# Patient Record
Sex: FEMALE | Race: WHITE | NOT HISPANIC OR LATINO
[De-identification: names, ages, dates, MRNs, and addresses within clinical notes are randomized per-mention and may not be internally consistent; named-entity substitution may affect disease eponyms.]

---

## 2017-02-09 ENCOUNTER — APPOINTMENT (OUTPATIENT)
Dept: FAMILY MEDICINE | Facility: CLINIC | Age: 52
End: 2017-02-09

## 2017-09-08 ENCOUNTER — APPOINTMENT (OUTPATIENT)
Dept: FAMILY MEDICINE | Facility: CLINIC | Age: 52
End: 2017-09-08
Payer: SELF-PAY

## 2017-09-08 VITALS
DIASTOLIC BLOOD PRESSURE: 80 MMHG | BODY MASS INDEX: 21.33 KG/M2 | SYSTOLIC BLOOD PRESSURE: 120 MMHG | RESPIRATION RATE: 16 BRPM | HEIGHT: 65 IN | HEART RATE: 76 BPM | OXYGEN SATURATION: 98 % | WEIGHT: 128 LBS

## 2017-09-08 DIAGNOSIS — N95.1 MENOPAUSAL AND FEMALE CLIMACTERIC STATES: ICD-10-CM

## 2017-09-08 DIAGNOSIS — L03.90 CELLULITIS, UNSPECIFIED: ICD-10-CM

## 2017-09-08 PROCEDURE — 99215 OFFICE O/P EST HI 40 MIN: CPT

## 2017-12-15 ENCOUNTER — APPOINTMENT (OUTPATIENT)
Dept: FAMILY MEDICINE | Facility: CLINIC | Age: 52
End: 2017-12-15

## 2020-01-17 ENCOUNTER — APPOINTMENT (OUTPATIENT)
Dept: FAMILY MEDICINE | Facility: CLINIC | Age: 55
End: 2020-01-17

## 2020-03-02 ENCOUNTER — APPOINTMENT (OUTPATIENT)
Dept: FAMILY MEDICINE | Facility: CLINIC | Age: 55
End: 2020-03-02
Payer: COMMERCIAL

## 2020-03-02 VITALS
WEIGHT: 126 LBS | BODY MASS INDEX: 20.99 KG/M2 | HEART RATE: 74 BPM | RESPIRATION RATE: 14 BRPM | SYSTOLIC BLOOD PRESSURE: 122 MMHG | TEMPERATURE: 98.1 F | HEIGHT: 65 IN | OXYGEN SATURATION: 96 % | DIASTOLIC BLOOD PRESSURE: 84 MMHG

## 2020-03-02 DIAGNOSIS — Z78.9 OTHER SPECIFIED HEALTH STATUS: ICD-10-CM

## 2020-03-02 PROCEDURE — 99204 OFFICE O/P NEW MOD 45 MIN: CPT | Mod: 25

## 2020-03-02 PROCEDURE — G0444 DEPRESSION SCREEN ANNUAL: CPT

## 2020-03-02 NOTE — HEALTH RISK ASSESSMENT
[No] : No [No falls in past year] : Patient reported no falls in the past year [0] : 2) Feeling down, depressed, or hopeless: Not at all (0) [de-identified] : none  [de-identified] : none  [] : No [[id number]] : 0 [de-identified] : healthy  [de-identified] : none

## 2020-03-02 NOTE — REVIEW OF SYSTEMS
[Fatigue] : fatigue [Joint Pain] : joint pain [Joint Swelling] : joint swelling [Negative] : Heme/Lymph

## 2020-03-05 ENCOUNTER — FORM ENCOUNTER (OUTPATIENT)
Age: 55
End: 2020-03-05

## 2020-03-06 ENCOUNTER — APPOINTMENT (OUTPATIENT)
Dept: MAMMOGRAPHY | Facility: CLINIC | Age: 55
End: 2020-03-06

## 2020-03-06 ENCOUNTER — APPOINTMENT (OUTPATIENT)
Dept: RADIOLOGY | Facility: CLINIC | Age: 55
End: 2020-03-06
Payer: COMMERCIAL

## 2020-03-06 PROCEDURE — 73130 X-RAY EXAM OF HAND: CPT | Mod: RT

## 2020-03-06 PROCEDURE — 77063 BREAST TOMOSYNTHESIS BI: CPT

## 2020-03-06 PROCEDURE — 77067 SCR MAMMO BI INCL CAD: CPT

## 2020-03-06 PROCEDURE — 73502 X-RAY EXAM HIP UNI 2-3 VIEWS: CPT | Mod: RT

## 2020-03-16 ENCOUNTER — APPOINTMENT (OUTPATIENT)
Dept: FAMILY MEDICINE | Facility: CLINIC | Age: 55
End: 2020-03-16

## 2020-03-18 ENCOUNTER — FORM ENCOUNTER (OUTPATIENT)
Age: 55
End: 2020-03-18

## 2020-03-19 ENCOUNTER — APPOINTMENT (OUTPATIENT)
Dept: ULTRASOUND IMAGING | Facility: CLINIC | Age: 55
End: 2020-03-19
Payer: COMMERCIAL

## 2020-03-19 PROCEDURE — 76642 ULTRASOUND BREAST LIMITED: CPT | Mod: 50

## 2020-04-22 ENCOUNTER — APPOINTMENT (OUTPATIENT)
Dept: GASTROENTEROLOGY | Facility: CLINIC | Age: 55
End: 2020-04-22

## 2020-07-10 ENCOUNTER — APPOINTMENT (OUTPATIENT)
Dept: BREAST CENTER | Facility: CLINIC | Age: 55
End: 2020-07-10

## 2020-09-18 ENCOUNTER — APPOINTMENT (OUTPATIENT)
Dept: FAMILY MEDICINE | Facility: CLINIC | Age: 55
End: 2020-09-18
Payer: COMMERCIAL

## 2020-09-18 ENCOUNTER — LABORATORY RESULT (OUTPATIENT)
Age: 55
End: 2020-09-18

## 2020-09-18 VITALS
OXYGEN SATURATION: 99 % | DIASTOLIC BLOOD PRESSURE: 83 MMHG | SYSTOLIC BLOOD PRESSURE: 129 MMHG | WEIGHT: 118 LBS | HEART RATE: 64 BPM | RESPIRATION RATE: 14 BRPM | HEIGHT: 65 IN | BODY MASS INDEX: 19.66 KG/M2

## 2020-09-18 DIAGNOSIS — R41.3 OTHER AMNESIA: ICD-10-CM

## 2020-09-18 DIAGNOSIS — E55.9 VITAMIN D DEFICIENCY, UNSPECIFIED: ICD-10-CM

## 2020-09-18 LAB — CYTOLOGY CVX/VAG DOC THIN PREP: NORMAL

## 2020-09-18 PROCEDURE — 99214 OFFICE O/P EST MOD 30 MIN: CPT | Mod: 25

## 2020-09-18 PROCEDURE — 36415 COLL VENOUS BLD VENIPUNCTURE: CPT

## 2020-09-18 NOTE — PHYSICAL EXAM
[Ill-Appearing] : ill-appearing [Normal] : normal rate, regular rhythm, normal S1 and S2 and no murmur heard [No Edema] : there was no peripheral edema [de-identified] : easily crying and depressed

## 2020-09-18 NOTE — HISTORY OF PRESENT ILLNESS
[FreeTextEntry1] : Follow up  [de-identified] : BREONNA RAY is a 54 year old female who presents with\par Right shoulder pain she has the pain for the past 2 weeks from working out\par She has been felling very depressed . She has been having episodes were she does not want to get out of her house or stay home alone. She  feels she is very sensitive to everything that is happening around her, she is crying often. She feels supported by her daughter and . He has been staying with her for the past week and she took of from work to relax. She feels suicidal thoughts, she denies a plan. she agreed that she will not do anything to hurt herself. She recognizes that she needs help. \par She did not have the blood work ordered during our first visit, will so it today. \par She is also c/o of pain on her right shoulder. \par

## 2020-09-25 LAB
25(OH)D3 SERPL-MCNC: 48.8 NG/ML
ANA SER IF-ACNC: NEGATIVE
APPEARANCE: CLEAR
BACTERIA: ABNORMAL
BASOPHILS # BLD AUTO: 0.05 K/UL
BASOPHILS NFR BLD AUTO: 1 %
BILIRUBIN URINE: NEGATIVE
BLOOD URINE: NEGATIVE
CHOLEST SERPL-MCNC: 231 MG/DL
CHOLEST/HDLC SERPL: 3.6 RATIO
COLOR: NORMAL
CRP SERPL-MCNC: 0.1 MG/DL
DSDNA AB SER-ACNC: <12 IU/ML
ENA SS-A AB SER IA-ACNC: <0.2 AL
ENA SS-B AB SER IA-ACNC: 1.2 AL
EOSINOPHIL # BLD AUTO: 0.24 K/UL
EOSINOPHIL NFR BLD AUTO: 5 %
ERYTHROCYTE [SEDIMENTATION RATE] IN BLOOD BY WESTERGREN METHOD: 4 MM/HR
ESTIMATED AVERAGE GLUCOSE: 117 MG/DL
FERRITIN SERPL-MCNC: 222 NG/ML
FOLATE SERPL-MCNC: 16.1 NG/ML
GLUCOSE QUALITATIVE U: NEGATIVE
HBA1C MFR BLD HPLC: 5.7 %
HCT VFR BLD CALC: 38.4 %
HDLC SERPL-MCNC: 64 MG/DL
HGB BLD-MCNC: 12.2 G/DL
HLA-B27 RELATED AG QL: NORMAL
HYALINE CASTS: 2 /LPF
IMM GRANULOCYTES NFR BLD AUTO: 0.2 %
IRON SERPL-MCNC: 102 UG/DL
KETONES URINE: NEGATIVE
LDLC SERPL CALC-MCNC: 157 MG/DL
LEUKOCYTE ESTERASE URINE: ABNORMAL
LYMPHOCYTES # BLD AUTO: 2.05 K/UL
LYMPHOCYTES NFR BLD AUTO: 42.8 %
MAGNESIUM SERPL-MCNC: 2 MG/DL
MAN DIFF?: NORMAL
MCHC RBC-ENTMCNC: 31.3 PG
MCHC RBC-ENTMCNC: 31.8 GM/DL
MCV RBC AUTO: 98.5 FL
MICROSCOPIC-UA: NORMAL
MONOCYTES # BLD AUTO: 0.4 K/UL
MONOCYTES NFR BLD AUTO: 8.4 %
NEUTROPHILS # BLD AUTO: 2.04 K/UL
NEUTROPHILS NFR BLD AUTO: 42.6 %
NITRITE URINE: NEGATIVE
PH URINE: 6.5
PLATELET # BLD AUTO: 183 K/UL
PROTEIN URINE: NEGATIVE
RBC # BLD: 3.9 M/UL
RBC # FLD: 12.2 %
RED BLOOD CELLS URINE: 2 /HPF
RHEUMATOID FACT SER QL: <10 IU/ML
SPECIFIC GRAVITY URINE: 1.01
SQUAMOUS EPITHELIAL CELLS: 11 /HPF
T3 SERPL-MCNC: 98 NG/DL
T3FREE SERPL-MCNC: 3.02 PG/ML
T3RU NFR SERPL: 1 TBI
T4 FREE SERPL-MCNC: 1.4 NG/DL
TRIGL SERPL-MCNC: 51 MG/DL
TSH SERPL-ACNC: 0.47 UIU/ML
URATE SERPL-MCNC: 3.1 MG/DL
UROBILINOGEN URINE: NORMAL
VIT B12 SERPL-MCNC: 903 PG/ML
WBC # FLD AUTO: 4.79 K/UL
WHITE BLOOD CELLS URINE: 11 /HPF

## 2020-10-05 ENCOUNTER — APPOINTMENT (OUTPATIENT)
Dept: FAMILY MEDICINE | Facility: CLINIC | Age: 55
End: 2020-10-05
Payer: COMMERCIAL

## 2020-10-05 ENCOUNTER — NON-APPOINTMENT (OUTPATIENT)
Age: 55
End: 2020-10-05

## 2020-10-05 VITALS
HEIGHT: 65 IN | DIASTOLIC BLOOD PRESSURE: 83 MMHG | TEMPERATURE: 98 F | OXYGEN SATURATION: 97 % | WEIGHT: 118 LBS | SYSTOLIC BLOOD PRESSURE: 118 MMHG | BODY MASS INDEX: 19.66 KG/M2 | RESPIRATION RATE: 16 BRPM | HEART RATE: 65 BPM

## 2020-10-05 DIAGNOSIS — N63.0 UNSPECIFIED LUMP IN UNSPECIFIED BREAST: ICD-10-CM

## 2020-10-05 PROCEDURE — 93000 ELECTROCARDIOGRAM COMPLETE: CPT

## 2020-10-05 PROCEDURE — 99396 PREV VISIT EST AGE 40-64: CPT | Mod: 25

## 2020-10-09 NOTE — HEALTH RISK ASSESSMENT
[Good] : ~his/her~  mood as  good [Patient declined PAP Smear] : Patient declined PAP Smear [Patient declined bone density test] : Patient declined bone density test [Patient declined colonoscopy] : Patient declined colonoscopy [HIV test declined] : HIV test declined [Hepatitis C test declined] : Hepatitis C test declined [Single] : single [Sexually Active] : sexually active [No] : In the past 12 months have you used drugs other than those required for medical reasons? No [No falls in past year] : Patient reported no falls in the past year [No Retinopathy] : No retinopathy [Patient reported mammogram was abnormal] : Patient reported mammogram was abnormal [Change in mental status noted] : Change in mental status noted [Language] : difficulty with language [None] : None [With Family] : lives with family [Employed] : employed [Feels Safe at Home] : Feels safe at home [Fully functional (bathing, dressing, toileting, transferring, walking, feeding)] : Fully functional (bathing, dressing, toileting, transferring, walking, feeding) [Fully functional (using the telephone, shopping, preparing meals, housekeeping, doing laundry, using] : Fully functional and needs no help or supervision to perform IADLs (using the telephone, shopping, preparing meals, housekeeping, doing laundry, using transportation, managing medications and managing finances) [Smoke Detector] : smoke detector [Carbon Monoxide Detector] : carbon monoxide detector [Seat Belt] :  uses seat belt [Sunscreen] : uses sunscreen [Reviewed updated] : Reviewed updated [] : No [de-identified] : none  [de-identified] : none  [de-identified] : none  [Reports changes in hearing] : Reports no changes in hearing [Reports changes in vision] : Reports no changes in vision [Reports changes in dental health] : Reports no changes in dental health [TB Exposure] : is not being exposed to tuberculosis [MammogramDate] : 2019 [ColonoscopyComments] : scheduled

## 2020-10-09 NOTE — PLAN
[FreeTextEntry1] : Spirometry not done due to covid \par EKG done, results reviewed and discussed with patient.\par Hearing test done, results reviewed and discussed with patient.\par We spent sufficient time to discuss aspects of care; questions were answered  to patient's satisfaction.The diagnosis and care plan were discussed with patient in detail.  Patient test results were  reviewed and explained in full. All questions were answered.

## 2020-10-09 NOTE — HISTORY OF PRESENT ILLNESS
[FreeTextEntry1] : Annual physical  [de-identified] : BREONNA RAY is a 54 year old female who presents for annual physical \par She was started on Prozac and Abilify. \par She feels better, we discussed all her results at length. she will follow up with rheumatologist and orthopedics

## 2020-10-29 ENCOUNTER — APPOINTMENT (OUTPATIENT)
Dept: RHEUMATOLOGY | Facility: CLINIC | Age: 55
End: 2020-10-29

## 2020-12-09 ENCOUNTER — APPOINTMENT (OUTPATIENT)
Dept: FAMILY MEDICINE | Facility: CLINIC | Age: 55
End: 2020-12-09
Payer: COMMERCIAL

## 2020-12-09 VITALS
HEART RATE: 69 BPM | BODY MASS INDEX: 19.66 KG/M2 | WEIGHT: 118 LBS | HEIGHT: 65 IN | DIASTOLIC BLOOD PRESSURE: 73 MMHG | TEMPERATURE: 96.9 F | OXYGEN SATURATION: 96 % | SYSTOLIC BLOOD PRESSURE: 114 MMHG

## 2020-12-09 DIAGNOSIS — R92.8 OTHER ABNORMAL AND INCONCLUSIVE FINDINGS ON DIAGNOSTIC IMAGING OF BREAST: ICD-10-CM

## 2020-12-09 LAB
BILIRUB UR QL STRIP: NORMAL
CLARITY UR: NORMAL
GLUCOSE UR-MCNC: NORMAL
HCG UR QL: NORMAL EU/DL
HGB UR QL STRIP.AUTO: NORMAL
KETONES UR-MCNC: NORMAL
LEUKOCYTE ESTERASE UR QL STRIP: NORMAL
NITRITE UR QL STRIP: NORMAL
PH UR STRIP: 6.5
PROT UR STRIP-MCNC: NORMAL
SP GR UR STRIP: 1.03

## 2020-12-09 PROCEDURE — 99072 ADDL SUPL MATRL&STAF TM PHE: CPT

## 2020-12-09 PROCEDURE — 99213 OFFICE O/P EST LOW 20 MIN: CPT | Mod: 25

## 2020-12-09 PROCEDURE — 81003 URINALYSIS AUTO W/O SCOPE: CPT | Mod: QW

## 2020-12-10 PROBLEM — R92.8 ABNORMAL MAMMOGRAPHY: Status: ACTIVE | Noted: 2020-12-09

## 2020-12-10 NOTE — HISTORY OF PRESENT ILLNESS
[FreeTextEntry8] : BREONNA RAY is a 55 year old female who presents with complaints of pain and burning when urinating. She reports the pains has been there for at least 3 days and is getting worse.  The pain is not radiating. She started drinking cranberry juice, no OTC. \par Denies hematuria, denies fevers \par In addition we discussed repeating her MAmmogram, she has not made an patricia to see the breast specialist.

## 2020-12-10 NOTE — HEALTH RISK ASSESSMENT
[No] : In the past 12 months have you used drugs other than those required for medical reasons? No [No falls in past year] : Patient reported no falls in the past year [] : No [de-identified] : none  [de-identified] : none  [de-identified] : none  [de-identified] : none

## 2020-12-10 NOTE — PHYSICAL EXAM
[Ill-Appearing] : ill-appearing [No Edema] : there was no peripheral edema [No CVA Tenderness] : no CVA  tenderness [Normal] : affect was normal and insight and judgment were intact

## 2020-12-10 NOTE — REVIEW OF SYSTEMS
[Fatigue] : fatigue [Dysuria] : dysuria [Nocturia] : nocturia [Vaginal Discharge] : no vaginal discharge [Dysmenorrhea] : dysmenorrhea [Negative] : Heme/Lymph

## 2020-12-14 LAB — BACTERIA UR CULT: ABNORMAL

## 2021-01-05 ENCOUNTER — APPOINTMENT (OUTPATIENT)
Dept: FAMILY MEDICINE | Facility: CLINIC | Age: 56
End: 2021-01-05

## 2021-01-19 ENCOUNTER — APPOINTMENT (OUTPATIENT)
Dept: ULTRASOUND IMAGING | Facility: CLINIC | Age: 56
End: 2021-01-19
Payer: COMMERCIAL

## 2021-01-19 ENCOUNTER — RESULT REVIEW (OUTPATIENT)
Age: 56
End: 2021-01-19

## 2021-01-19 PROCEDURE — 76642 ULTRASOUND BREAST LIMITED: CPT | Mod: 50

## 2021-01-22 ENCOUNTER — APPOINTMENT (OUTPATIENT)
Dept: FAMILY MEDICINE | Facility: CLINIC | Age: 56
End: 2021-01-22
Payer: COMMERCIAL

## 2021-01-22 ENCOUNTER — LABORATORY RESULT (OUTPATIENT)
Age: 56
End: 2021-01-22

## 2021-01-22 VITALS
TEMPERATURE: 98.1 F | DIASTOLIC BLOOD PRESSURE: 72 MMHG | WEIGHT: 118 LBS | BODY MASS INDEX: 19.66 KG/M2 | SYSTOLIC BLOOD PRESSURE: 104 MMHG | HEART RATE: 73 BPM | HEIGHT: 65 IN | OXYGEN SATURATION: 99 %

## 2021-01-22 VITALS — TEMPERATURE: 97.3 F

## 2021-01-22 DIAGNOSIS — R63.4 ABNORMAL WEIGHT LOSS: ICD-10-CM

## 2021-01-22 PROCEDURE — 99072 ADDL SUPL MATRL&STAF TM PHE: CPT

## 2021-01-22 PROCEDURE — 99214 OFFICE O/P EST MOD 30 MIN: CPT | Mod: 25

## 2021-01-22 NOTE — PHYSICAL EXAM
[Ill-Appearing] : ill-appearing [Cachectic] : cachexia was observed [No Edema] : there was no peripheral edema [Normal] : affect was normal and insight and judgment were intact [de-identified] : right shoulder painful ROM

## 2021-01-22 NOTE — REVIEW OF SYSTEMS
[Fatigue] : fatigue [Joint Pain] : joint pain [Joint Swelling] : joint swelling [Muscle Pain] : muscle pain [Back Pain] : back pain [Negative] : Heme/Lymph

## 2021-01-22 NOTE — HISTORY OF PRESENT ILLNESS
[FreeTextEntry1] : weight loss  [de-identified] : BREONNA RAY is a 55 year old female who presents for follow up, she continues to try to gain weight. She has been increasing her caloric intake and is feeling hungry. She has not gain any weight since her last visit. She is back to work as a  in a gym and feels better. She is not feeling depressed and wants to feel more energetic. She is very concerned about her weight loss. We discussed a dietitian consult and she is agreeable. \par In addition she completed Physical therapy for her right shoulder pain, she will follow up with orthopedic for possible MRI and injections. She continues to have pain. \par We discussed the breast US, she will have a mammogram follow up. \par

## 2021-01-26 ENCOUNTER — APPOINTMENT (OUTPATIENT)
Dept: SURGERY | Facility: CLINIC | Age: 56
End: 2021-01-26

## 2021-01-26 LAB
APPEARANCE: CLEAR
BACTERIA: ABNORMAL
BILIRUBIN URINE: NEGATIVE
BLOOD URINE: NEGATIVE
CHOLEST SERPL-MCNC: 227 MG/DL
COLOR: NORMAL
FOLATE SERPL-MCNC: 9.2 NG/ML
GLUCOSE QUALITATIVE U: NEGATIVE
HDLC SERPL-MCNC: 79 MG/DL
HYALINE CASTS: 3 /LPF
IRON SERPL-MCNC: 46 UG/DL
KETONES URINE: NEGATIVE
LDLC SERPL CALC-MCNC: 133 MG/DL
LEUKOCYTE ESTERASE URINE: ABNORMAL
MAGNESIUM SERPL-MCNC: 2.2 MG/DL
MICROSCOPIC-UA: NORMAL
NITRITE URINE: NEGATIVE
NONHDLC SERPL-MCNC: 148 MG/DL
PH URINE: 6
PROTEIN URINE: NEGATIVE
RED BLOOD CELLS URINE: 1 /HPF
SPECIFIC GRAVITY URINE: 1.02
SQUAMOUS EPITHELIAL CELLS: 8 /HPF
T3 SERPL-MCNC: 86 NG/DL
T3FREE SERPL-MCNC: 2.31 PG/ML
T3RU NFR SERPL: 1.1 TBI
T4 FREE SERPL-MCNC: 1 NG/DL
TRIGL SERPL-MCNC: 76 MG/DL
TSH SERPL-ACNC: 0.54 UIU/ML
UROBILINOGEN URINE: NORMAL
VIT B12 SERPL-MCNC: 524 PG/ML
WHITE BLOOD CELLS URINE: 16 /HPF

## 2021-03-08 ENCOUNTER — APPOINTMENT (OUTPATIENT)
Dept: OPHTHALMOLOGY | Facility: CLINIC | Age: 56
End: 2021-03-08

## 2021-03-15 ENCOUNTER — APPOINTMENT (OUTPATIENT)
Dept: RHEUMATOLOGY | Facility: CLINIC | Age: 56
End: 2021-03-15
Payer: COMMERCIAL

## 2021-03-15 DIAGNOSIS — R76.8 OTHER SPECIFIED ABNORMAL IMMUNOLOGICAL FINDINGS IN SERUM: ICD-10-CM

## 2021-03-15 DIAGNOSIS — F12.90 CANNABIS USE, UNSPECIFIED, UNCOMPLICATED: ICD-10-CM

## 2021-03-15 PROCEDURE — 99204 OFFICE O/P NEW MOD 45 MIN: CPT | Mod: 25

## 2021-03-15 PROCEDURE — 99072 ADDL SUPL MATRL&STAF TM PHE: CPT

## 2021-03-16 NOTE — HISTORY OF PRESENT ILLNESS
[FreeTextEntry1] : 55y F here for abnormal labs, referred from Gunjan Buckley NP\par \par - history of no medical coverage x 20 years and had PCP eval for first time in 4-5 years. Labs noted +SSA low titer w/ negative RF and JAYDEN. ESR/CRP normal as well. TFT normal, other labs within normal limits other than slight A1c in pre-DM range and elevated lipids.\par - notes diffuse body pains, has OA in hands, knees, hips. Reports early and severe OA in her mother and OA in her father\par - rates pain 10/10 w/ <30 min AM stiffness.\par - SH: works as , up early hours; remains active w/ working out.  Consumes large amounts of caffeine, occasional recreational marijuana use- uses for pain as well. Denies tobacco use or other iliicit drugs\par - ROS: denies constitutional sxs of fever/chills, weight loss, alopecia, oral/nasal ulcers, sicca sxs, CP/SOB, hematuria/frothy urine, myalgias, arthralgias/arthritis, Raynaud's, rash, nodules, or photosensitivity.  [de-identified] : \par \par All other ROS negative except as mentioned in HPI.

## 2021-03-16 NOTE — ASSESSMENT
[FreeTextEntry1] : 55y F with +SSA in low titer w/o evidence of polyarthritis or parotiditis and possibly very mild sicca, as she uses eye drops nightly and reports dry mouth and dehydration, however this may be 2/2 high caffeine consumption as well. Regardless, I would not diagnose her w/ systemic Sjögren's syndrome due to minimal symptoms and low-titer SSA without +RF.  I would obtain full AVISE panel and markers today. She has degenerative osteoarthritis w/ a strong family history of early and severe disease, which she has since her 40s. She had previous L IACS of her CMC with poor results.\par \par - labs\par - c/w supportive measures- oral hygiene, frequent water sips and consumption, eye drops prn\par \par RTO 1 year.

## 2021-03-16 NOTE — PHYSICAL EXAM
[Sclera] : the sclera and conjunctiva were normal [PERRL With Normal Accommodation] : pupils were equal in size, round, and reactive to light [Extraocular Movements] : extraocular movements were intact [Outer Ear] : the ears and nose were normal in appearance [Hearing Threshold Finger Rub Not [place]] : hearing was normal [Examination Of The Oral Cavity] : the lips and gums were normal [Nasal Cavity] : the nasal mucosa and septum were normal [Oropharynx] : the oropharynx was normal [Neck Appearance] : the appearance of the neck was normal [Neck Cervical Mass (___cm)] : no neck mass was observed [Jugular Venous Distention Increased] : there was no jugular-venous distention [Thyroid Diffuse Enlargement] : the thyroid was not enlarged [Thyroid Nodule] : there were no palpable thyroid nodules [Respiration, Rhythm And Depth] : normal respiratory rhythm and effort [Auscultation Breath Sounds / Voice Sounds] : lungs were clear to auscultation bilaterally [Heart Rate And Rhythm] : heart rate was normal and rhythm regular [Heart Sounds] : normal S1 and S2 [Heart Sounds Gallop] : no gallops [Murmurs] : no murmurs [Heart Sounds Pericardial Friction Rub] : no pericardial rub [Full Pulse] : the pedal pulses are present [Edema] : there was no peripheral edema [Bowel Sounds] : normal bowel sounds [Abdomen Soft] : soft [Abdomen Tenderness] : non-tender [Abdomen Mass (___ Cm)] : no abdominal mass palpated [Cervical Lymph Nodes Enlarged Posterior Bilaterally] : posterior cervical [Cervical Lymph Nodes Enlarged Anterior Bilaterally] : anterior cervical [No CVA Tenderness] : no ~M costovertebral angle tenderness [No Spinal Tenderness] : no spinal tenderness [Abnormal Walk] : normal gait [Nail Clubbing] : no clubbing  or cyanosis of the fingernails [Musculoskeletal - Swelling] : no joint swelling seen [Motor Tone] : muscle strength and tone were normal [Skin Color & Pigmentation] : normal skin color and pigmentation [Skin Turgor] : normal skin turgor [Skin Lesions] : no skin lesions [Deep Tendon Reflexes (DTR)] : deep tendon reflexes were 2+ and symmetric [Sensation] : the sensory exam was normal to light touch and pinprick [Motor Exam] : the motor exam was normal [No Focal Deficits] : no focal deficits [Affect] : the affect was normal [Mood] : the mood was normal [General Appearance - Alert] : alert [General Appearance - In No Acute Distress] : in no acute distress [General Appearance - Well Nourished] : well nourished [General Appearance - Well Developed] : well developed [General Appearance - Well-Appearing] : healthy appearing [] : normal voice and communication [FreeTextEntry1] : \par Hands- moderate very early OA changes in B/L hands with Heberden and Kevon's nodes. \par Wrists- normal\par Elbows- normal\par Shoulders- normal\par Hips- Reduced external rotation bilaterally. \par Knees- Patellofemoral crepitus bilaterally without effusion. \par Ankles- normal\par Feet- normal\par MMT 10/10 proximally/distally bilaterally.

## 2021-04-29 ENCOUNTER — OUTPATIENT (OUTPATIENT)
Dept: OUTPATIENT SERVICES | Facility: HOSPITAL | Age: 56
LOS: 1 days | End: 2021-04-29
Payer: COMMERCIAL

## 2021-04-29 PROCEDURE — 93010 ELECTROCARDIOGRAM REPORT: CPT

## 2021-05-04 ENCOUNTER — APPOINTMENT (OUTPATIENT)
Dept: DISASTER EMERGENCY | Facility: CLINIC | Age: 56
End: 2021-05-04

## 2021-05-05 LAB — SARS-COV-2 N GENE NPH QL NAA+PROBE: NOT DETECTED

## 2021-05-06 ENCOUNTER — APPOINTMENT (OUTPATIENT)
Dept: FAMILY MEDICINE | Facility: CLINIC | Age: 56
End: 2021-05-06
Payer: COMMERCIAL

## 2021-05-06 VITALS
TEMPERATURE: 98.8 F | HEART RATE: 78 BPM | OXYGEN SATURATION: 98 % | BODY MASS INDEX: 19.66 KG/M2 | SYSTOLIC BLOOD PRESSURE: 131 MMHG | WEIGHT: 118 LBS | DIASTOLIC BLOOD PRESSURE: 75 MMHG | HEIGHT: 65 IN | RESPIRATION RATE: 16 BRPM

## 2021-05-06 DIAGNOSIS — M17.0 BILATERAL PRIMARY OSTEOARTHRITIS OF KNEE: ICD-10-CM

## 2021-05-06 DIAGNOSIS — M19.042 PRIMARY OSTEOARTHRITIS, RIGHT HAND: ICD-10-CM

## 2021-05-06 DIAGNOSIS — Z13.820 ENCOUNTER FOR SCREENING FOR OSTEOPOROSIS: ICD-10-CM

## 2021-05-06 DIAGNOSIS — M19.041 PRIMARY OSTEOARTHRITIS, RIGHT HAND: ICD-10-CM

## 2021-05-06 DIAGNOSIS — M19.011 PRIMARY OSTEOARTHRITIS, RIGHT SHOULDER: ICD-10-CM

## 2021-05-06 DIAGNOSIS — M16.0 BILATERAL PRIMARY OSTEOARTHRITIS OF HIP: ICD-10-CM

## 2021-05-06 DIAGNOSIS — M19.012 PRIMARY OSTEOARTHRITIS, RIGHT SHOULDER: ICD-10-CM

## 2021-05-06 PROCEDURE — 99072 ADDL SUPL MATRL&STAF TM PHE: CPT

## 2021-05-06 PROCEDURE — 99214 OFFICE O/P EST MOD 30 MIN: CPT

## 2021-05-06 RX ORDER — OXYCODONE AND ACETAMINOPHEN 5; 325 MG/1; MG/1
5-325 TABLET ORAL
Qty: 20 | Refills: 0 | Status: DISCONTINUED | COMMUNITY
Start: 2020-10-15 | End: 2021-05-06

## 2021-05-06 RX ORDER — PHENAZOPYRIDINE 100 MG/1
100 TABLET, FILM COATED ORAL 3 TIMES DAILY
Qty: 15 | Refills: 0 | Status: DISCONTINUED | COMMUNITY
Start: 2020-12-09 | End: 2021-05-06

## 2021-05-06 RX ORDER — ARIPIPRAZOLE 5 MG/1
5 TABLET ORAL
Qty: 30 | Refills: 0 | Status: DISCONTINUED | COMMUNITY
Start: 2020-09-18 | End: 2021-05-06

## 2021-05-06 RX ORDER — CIPROFLOXACIN HYDROCHLORIDE 250 MG/1
250 TABLET, FILM COATED ORAL
Qty: 6 | Refills: 0 | Status: DISCONTINUED | COMMUNITY
Start: 2020-12-09 | End: 2021-05-06

## 2021-05-06 RX ORDER — CLINDAMYCIN PHOSPHATE 20 MG/G
2 CREAM VAGINAL
Qty: 40 | Refills: 0 | Status: DISCONTINUED | COMMUNITY
Start: 2020-10-29 | End: 2021-05-06

## 2021-05-06 RX ORDER — FLUOXETINE HYDROCHLORIDE 10 MG/1
10 CAPSULE ORAL
Qty: 30 | Refills: 0 | Status: DISCONTINUED | COMMUNITY
Start: 2020-09-18 | End: 2021-05-06

## 2021-05-06 NOTE — HISTORY OF PRESENT ILLNESS
[No Pertinent Cardiac History] : no history of aortic stenosis, atrial fibrillation, coronary artery disease, recent myocardial infarction, or implantable device/pacemaker [No Pertinent Pulmonary History] : no history of asthma, COPD, sleep apnea, or smoking [No Adverse Anesthesia Reaction] : no adverse anesthesia reaction in self or family member [(Patient denies any chest pain, claudication, dyspnea on exertion, orthopnea, palpitations or syncope)] : Patient denies any chest pain, claudication, dyspnea on exertion, orthopnea, palpitations or syncope [Excellent (>10 METs)] : Excellent (>10 METs) [Chronic Anticoagulation] : no chronic anticoagulation [Chronic Kidney Disease] : no chronic kidney disease [Diabetes] : no diabetes [FreeTextEntry1] : R shoulder arthroscopy  [FreeTextEntry2] : 05/07/21 [FreeTextEntry3] : Dr Sheehan   [FreeTextEntry4] : BREONNA RAY 55 year yrs old  female presents office comes for Preop Clearance for R shoulder Arthroscopy .\par Takes medical marihuana for the herniated disc in the neck and back and severe OA \par Patient states that Biceps repair and Rotator cuff repair and labrum .\par Patient states she can walk 4 miles without shortness of breath and 5 Flights of stairs .\par Patient states she lost weight after foot surgery lost her muscle mass and was depressed and now her weight is stable denies\par any GI complaints .\par Denies any other complaints. No Fever, Chills, Nausea, Vomiting, Diarrhea, Headache, Chest Pain, Shortness of breath or Abdominal pain.\par

## 2021-05-06 NOTE — ASSESSMENT
[High Risk Surgery - Intraperitoneal, Intrathoracic or Supringuinal Vascular Procedures] : High Risk Surgery - Intraperitoneal, Intrathoracic or Supringuinal Vascular Procedures - No (0) [Ischemic Heart Disease] : Ischemic Heart Disease - No (0) [Congestive Heart Failure] : Congestive Heart Failure - No (0) [Prior Cerebrovascular Accident or TIA] : Prior Cerebrovascular Accident or TIA - No (0) [Creatinine >= 2mg/dL (1 Point)] : Creatinine >= 2mg/dL - No (0) [Insulin-dependent Diabetic (1 Point)] : Insulin-dependent Diabetic - No (0) [0] : 0 , RCRI Class: I, Risk of Post-Op Cardiac Complications: 3.9%, 95% CI for Risk Estimate: 2.8% - 5.4% [Patient Optimized for Surgery] : Patient optimized for surgery [FreeTextEntry4] : PREOP Right shoulder arthroscopy \par RCRI -0 \par Meds reviewed.\par Patient will take morning meds with water the morning of procedure.\par Patient to stop all vitamins, aspirin and NSAIDs 5 days prior to procedure.\par Patient will followup after surgery \par Patient Moderate  risk for  Low risk Procedure \par Pt is medically cleared for procedure and medically optimized \par Patient is a nonsmoker.\par \par Chronic Back pain /Neck pain and Osteoarthritis\par -follows Pain management \par -Gets medical marihuana  \par \par Weight Loss after surgery \par -Gaining weight slowly about 14 pounds for the past 3 months \par -Drinking Boost \par -Heathy appetite eating well \par -Patient states she was depressed and lost her all her muscle mass \par -FOBT negative \par -US breast normal \par -Advised patient to follow up with GI for Colonoscopy \par \par \par Osteoporosis screening \par -DEXA scan \par \par \par RTO after follow up after surgery

## 2021-05-07 ENCOUNTER — OUTPATIENT (OUTPATIENT)
Dept: OUTPATIENT SERVICES | Facility: HOSPITAL | Age: 56
LOS: 1 days | End: 2021-05-07

## 2021-10-26 ENCOUNTER — APPOINTMENT (OUTPATIENT)
Dept: MRI IMAGING | Facility: CLINIC | Age: 56
End: 2021-10-26
Payer: COMMERCIAL

## 2021-10-26 PROCEDURE — 73221 MRI JOINT UPR EXTREM W/O DYE: CPT | Mod: RT

## 2021-11-05 ENCOUNTER — APPOINTMENT (OUTPATIENT)
Dept: FAMILY MEDICINE | Facility: CLINIC | Age: 56
End: 2021-11-05

## 2021-11-12 ENCOUNTER — OUTPATIENT (OUTPATIENT)
Dept: OUTPATIENT SERVICES | Facility: HOSPITAL | Age: 56
LOS: 1 days | End: 2021-11-12
Payer: COMMERCIAL

## 2021-11-12 PROCEDURE — 93010 ELECTROCARDIOGRAM REPORT: CPT

## 2021-11-16 ENCOUNTER — APPOINTMENT (OUTPATIENT)
Dept: FAMILY MEDICINE | Facility: CLINIC | Age: 56
End: 2021-11-16
Payer: COMMERCIAL

## 2021-11-16 ENCOUNTER — LABORATORY RESULT (OUTPATIENT)
Age: 56
End: 2021-11-16

## 2021-11-16 VITALS
SYSTOLIC BLOOD PRESSURE: 106 MMHG | TEMPERATURE: 98.2 F | BODY MASS INDEX: 18.66 KG/M2 | DIASTOLIC BLOOD PRESSURE: 73 MMHG | WEIGHT: 112 LBS | OXYGEN SATURATION: 99 % | HEART RATE: 73 BPM | HEIGHT: 65 IN

## 2021-11-16 PROCEDURE — 99214 OFFICE O/P EST MOD 30 MIN: CPT

## 2021-11-17 LAB
CHOLEST SERPL-MCNC: 239 MG/DL
HDLC SERPL-MCNC: 73 MG/DL
LDLC SERPL CALC-MCNC: 156 MG/DL
NONHDLC SERPL-MCNC: 165 MG/DL
TRIGL SERPL-MCNC: 49 MG/DL

## 2021-11-18 LAB
BASOPHILS # BLD AUTO: 0.07 K/UL
BASOPHILS NFR BLD AUTO: 1 %
EOSINOPHIL # BLD AUTO: 0.21 K/UL
EOSINOPHIL NFR BLD AUTO: 3 %
HCT VFR BLD CALC: 34.8 %
HGB BLD-MCNC: 11.2 G/DL
IMM GRANULOCYTES NFR BLD AUTO: 0.1 %
LYMPHOCYTES # BLD AUTO: 2.34 K/UL
LYMPHOCYTES NFR BLD AUTO: 33.9 %
MAN DIFF?: NORMAL
MCHC RBC-ENTMCNC: 31.3 PG
MCHC RBC-ENTMCNC: 32.2 GM/DL
MCV RBC AUTO: 97.2 FL
MONOCYTES # BLD AUTO: 0.48 K/UL
MONOCYTES NFR BLD AUTO: 7 %
NEUTROPHILS # BLD AUTO: 3.79 K/UL
NEUTROPHILS NFR BLD AUTO: 55 %
PLATELET # BLD AUTO: NORMAL K/UL
RBC # BLD: 3.58 M/UL
RBC # FLD: 12.9 %
WBC # FLD AUTO: 6.9 K/UL

## 2021-11-23 ENCOUNTER — APPOINTMENT (OUTPATIENT)
Dept: DISASTER EMERGENCY | Facility: CLINIC | Age: 56
End: 2021-11-23

## 2021-11-24 LAB — SARS-COV-2 N GENE NPH QL NAA+PROBE: NOT DETECTED

## 2021-11-26 ENCOUNTER — OUTPATIENT (OUTPATIENT)
Dept: OUTPATIENT SERVICES | Facility: HOSPITAL | Age: 56
LOS: 1 days | End: 2021-11-26

## 2022-02-28 DIAGNOSIS — M19.90 UNSPECIFIED OSTEOARTHRITIS, UNSPECIFIED SITE: ICD-10-CM

## 2022-03-24 LAB
A-TUMOR NECROSIS FACT SERPL-MCNC: 2.1 PG/ML
CA VI IGA AB: NORMAL
CA VI IGG AB: 2.1 EU/ML
CA VI IGM AB: 32.5 EU/ML
CK SERPL-CCNC: 96 U/L
ERYTHROCYTE [SEDIMENTATION RATE] IN BLOOD BY WESTERGREN METHOD: 4 MM/HR
IGNF SERPL-MCNC: <4.2 PG/ML
IL10 SERPL-MCNC: <2.8 PG/ML
IL12 SERPL-MCNC: <1.9 PG/ML
IL13 SERPL-MCNC: <1.7 PG/ML
IL17A SERPL-MCNC: <1.4 PG/ML
IL2 SERPL-MCNC: 101.1 PG/ML
IL2 SERPL-MCNC: <2.1 PG/ML
IL4 SERPL-MCNC: <2.2 PG/ML
IL6 SERPL-MCNC: <2 PG/ML
IL8 SERPL-MCNC: <3 PG/ML
INTERLEUKIN 1 BETA: <6.5 PG/ML
INTERLEUKIN 5: <2.1 PG/ML
MISCELLANEOUS TEST: NORMAL
PROC NAME: NORMAL
PSP IGA AB: 6.3 EU/ML
PSP IGG AB: 3.3 EU/ML
PSP IGM AB: NORMAL
SEROLOGY COMMENTS: NORMAL
SP-1 IGA AB: 3.5 EU/ML
SP-1 IGG AB: 2 EU/ML
SP-1 IGM AB: 4.9 EU/ML

## 2022-06-02 ENCOUNTER — APPOINTMENT (OUTPATIENT)
Dept: FAMILY MEDICINE | Facility: CLINIC | Age: 57
End: 2022-06-02
Payer: COMMERCIAL

## 2022-06-02 VITALS
WEIGHT: 113 LBS | HEART RATE: 91 BPM | TEMPERATURE: 98 F | DIASTOLIC BLOOD PRESSURE: 85 MMHG | SYSTOLIC BLOOD PRESSURE: 132 MMHG | HEIGHT: 65 IN | RESPIRATION RATE: 14 BRPM | BODY MASS INDEX: 18.83 KG/M2 | OXYGEN SATURATION: 98 %

## 2022-06-02 DIAGNOSIS — R42 DIZZINESS AND GIDDINESS: ICD-10-CM

## 2022-06-02 DIAGNOSIS — R51.9 HEADACHE, UNSPECIFIED: ICD-10-CM

## 2022-06-02 PROCEDURE — G0444 DEPRESSION SCREEN ANNUAL: CPT | Mod: 59

## 2022-06-02 PROCEDURE — 99214 OFFICE O/P EST MOD 30 MIN: CPT | Mod: 25

## 2022-06-02 RX ORDER — MECLIZINE HYDROCHLORIDE 25 MG/1
25 TABLET ORAL 4 TIMES DAILY
Qty: 120 | Refills: 0 | Status: ACTIVE | COMMUNITY
Start: 2022-06-02 | End: 1900-01-01

## 2022-06-13 ENCOUNTER — NON-APPOINTMENT (OUTPATIENT)
Age: 57
End: 2022-06-13

## 2022-06-16 ENCOUNTER — TRANSCRIPTION ENCOUNTER (OUTPATIENT)
Age: 57
End: 2022-06-16

## 2022-06-16 ENCOUNTER — NON-APPOINTMENT (OUTPATIENT)
Age: 57
End: 2022-06-16

## 2022-06-20 ENCOUNTER — APPOINTMENT (OUTPATIENT)
Dept: FAMILY MEDICINE | Facility: CLINIC | Age: 57
End: 2022-06-20

## 2022-07-29 ENCOUNTER — NON-APPOINTMENT (OUTPATIENT)
Age: 57
End: 2022-07-29

## 2022-07-29 ENCOUNTER — RESULT REVIEW (OUTPATIENT)
Age: 57
End: 2022-07-29

## 2022-07-29 ENCOUNTER — APPOINTMENT (OUTPATIENT)
Dept: FAMILY MEDICINE | Facility: CLINIC | Age: 57
End: 2022-07-29

## 2022-07-29 VITALS
RESPIRATION RATE: 18 BRPM | HEART RATE: 92 BPM | HEIGHT: 65 IN | TEMPERATURE: 98.9 F | SYSTOLIC BLOOD PRESSURE: 172 MMHG | DIASTOLIC BLOOD PRESSURE: 109 MMHG | OXYGEN SATURATION: 97 % | BODY MASS INDEX: 18.33 KG/M2 | WEIGHT: 110 LBS

## 2022-07-29 DIAGNOSIS — F32.A DEPRESSION, UNSPECIFIED: ICD-10-CM

## 2022-07-29 DIAGNOSIS — R23.2 FLUSHING: ICD-10-CM

## 2022-07-29 DIAGNOSIS — Z00.00 ENCOUNTER FOR GENERAL ADULT MEDICAL EXAMINATION W/OUT ABNORMAL FINDINGS: ICD-10-CM

## 2022-07-29 DIAGNOSIS — Z12.39 ENCOUNTER FOR OTHER SCREENING FOR MALIGNANT NEOPLASM OF BREAST: ICD-10-CM

## 2022-07-29 PROCEDURE — 93000 ELECTROCARDIOGRAM COMPLETE: CPT | Mod: 59

## 2022-07-29 PROCEDURE — 99396 PREV VISIT EST AGE 40-64: CPT | Mod: 25

## 2022-07-29 PROCEDURE — 92552 PURE TONE AUDIOMETRY AIR: CPT

## 2022-07-29 PROCEDURE — G0444 DEPRESSION SCREEN ANNUAL: CPT | Mod: 59

## 2022-11-01 ENCOUNTER — APPOINTMENT (OUTPATIENT)
Dept: ULTRASOUND IMAGING | Facility: CLINIC | Age: 57
End: 2022-11-01

## 2022-11-01 ENCOUNTER — RESULT REVIEW (OUTPATIENT)
Age: 57
End: 2022-11-01

## 2022-11-01 ENCOUNTER — APPOINTMENT (OUTPATIENT)
Dept: MAMMOGRAPHY | Facility: CLINIC | Age: 57
End: 2022-11-01

## 2022-11-01 ENCOUNTER — APPOINTMENT (OUTPATIENT)
Dept: MRI IMAGING | Facility: CLINIC | Age: 57
End: 2022-11-01

## 2022-11-01 PROCEDURE — 73220 MRI UPPR EXTREMITY W/O&W/DYE: CPT | Mod: RT

## 2022-11-01 PROCEDURE — 77062 BREAST TOMOSYNTHESIS BI: CPT

## 2022-11-01 PROCEDURE — A9585: CPT

## 2022-11-01 PROCEDURE — 77066 DX MAMMO INCL CAD BI: CPT

## 2022-11-01 PROCEDURE — 76641 ULTRASOUND BREAST COMPLETE: CPT | Mod: 50

## 2022-11-15 ENCOUNTER — APPOINTMENT (OUTPATIENT)
Dept: MRI IMAGING | Facility: CLINIC | Age: 57
End: 2022-11-15

## 2022-11-15 PROCEDURE — 72141 MRI NECK SPINE W/O DYE: CPT

## 2022-11-21 ENCOUNTER — APPOINTMENT (OUTPATIENT)
Dept: OPHTHALMOLOGY | Facility: CLINIC | Age: 57
End: 2022-11-21

## 2022-12-27 ENCOUNTER — APPOINTMENT (OUTPATIENT)
Dept: FAMILY MEDICINE | Facility: CLINIC | Age: 57
End: 2022-12-27
Payer: COMMERCIAL

## 2022-12-27 ENCOUNTER — NON-APPOINTMENT (OUTPATIENT)
Age: 57
End: 2022-12-27

## 2022-12-27 VITALS
WEIGHT: 110 LBS | DIASTOLIC BLOOD PRESSURE: 84 MMHG | BODY MASS INDEX: 18.33 KG/M2 | SYSTOLIC BLOOD PRESSURE: 144 MMHG | TEMPERATURE: 96.9 F | HEIGHT: 65 IN | HEART RATE: 74 BPM | OXYGEN SATURATION: 100 %

## 2022-12-27 DIAGNOSIS — M79.641 PAIN IN RIGHT HAND: ICD-10-CM

## 2022-12-27 DIAGNOSIS — Z01.818 ENCOUNTER FOR OTHER PREPROCEDURAL EXAMINATION: ICD-10-CM

## 2022-12-27 PROCEDURE — 99214 OFFICE O/P EST MOD 30 MIN: CPT

## 2023-03-13 ENCOUNTER — NON-APPOINTMENT (OUTPATIENT)
Age: 58
End: 2023-03-13

## 2023-03-16 ENCOUNTER — APPOINTMENT (OUTPATIENT)
Dept: FAMILY MEDICINE | Facility: CLINIC | Age: 58
End: 2023-03-16
Payer: COMMERCIAL

## 2023-03-16 ENCOUNTER — RESULT REVIEW (OUTPATIENT)
Age: 58
End: 2023-03-16

## 2023-03-16 VITALS
HEART RATE: 82 BPM | RESPIRATION RATE: 14 BRPM | DIASTOLIC BLOOD PRESSURE: 76 MMHG | SYSTOLIC BLOOD PRESSURE: 112 MMHG | WEIGHT: 110 LBS | OXYGEN SATURATION: 99 % | BODY MASS INDEX: 18.33 KG/M2 | HEIGHT: 65 IN | TEMPERATURE: 98 F

## 2023-03-16 DIAGNOSIS — M25.50 PAIN IN UNSPECIFIED JOINT: ICD-10-CM

## 2023-03-16 DIAGNOSIS — M54.2 CERVICALGIA: ICD-10-CM

## 2023-03-16 DIAGNOSIS — K21.9 GASTRO-ESOPHAGEAL REFLUX DISEASE W/OUT ESOPHAGITIS: ICD-10-CM

## 2023-03-16 DIAGNOSIS — R10.9 UNSPECIFIED ABDOMINAL PAIN: ICD-10-CM

## 2023-03-16 PROCEDURE — 99214 OFFICE O/P EST MOD 30 MIN: CPT

## 2023-03-20 ENCOUNTER — RESULT REVIEW (OUTPATIENT)
Age: 58
End: 2023-03-20

## 2023-03-21 ENCOUNTER — APPOINTMENT (OUTPATIENT)
Dept: CT IMAGING | Facility: CLINIC | Age: 58
End: 2023-03-21
Payer: COMMERCIAL

## 2023-03-21 PROCEDURE — 74178 CT ABD&PLV WO CNTR FLWD CNTR: CPT

## 2023-03-23 ENCOUNTER — APPOINTMENT (OUTPATIENT)
Dept: FAMILY MEDICINE | Facility: CLINIC | Age: 58
End: 2023-03-23

## 2023-03-24 ENCOUNTER — RESULT REVIEW (OUTPATIENT)
Age: 58
End: 2023-03-24

## 2023-03-24 ENCOUNTER — APPOINTMENT (OUTPATIENT)
Dept: RADIOLOGY | Facility: CLINIC | Age: 58
End: 2023-03-24
Payer: COMMERCIAL

## 2023-03-24 PROCEDURE — 72100 X-RAY EXAM L-S SPINE 2/3 VWS: CPT

## 2023-03-24 PROCEDURE — 73522 X-RAY EXAM HIPS BI 3-4 VIEWS: CPT

## 2023-03-30 ENCOUNTER — APPOINTMENT (OUTPATIENT)
Dept: FAMILY MEDICINE | Facility: CLINIC | Age: 58
End: 2023-03-30

## 2023-04-19 ENCOUNTER — RX RENEWAL (OUTPATIENT)
Age: 58
End: 2023-04-19

## 2023-05-16 ENCOUNTER — RX RENEWAL (OUTPATIENT)
Age: 58
End: 2023-05-16

## 2023-06-09 ENCOUNTER — APPOINTMENT (OUTPATIENT)
Dept: FAMILY MEDICINE | Facility: CLINIC | Age: 58
End: 2023-06-09

## 2023-06-20 ENCOUNTER — RX RENEWAL (OUTPATIENT)
Age: 58
End: 2023-06-20

## 2023-06-20 ENCOUNTER — APPOINTMENT (OUTPATIENT)
Dept: FAMILY MEDICINE | Facility: CLINIC | Age: 58
End: 2023-06-20

## 2023-06-27 ENCOUNTER — LABORATORY RESULT (OUTPATIENT)
Age: 58
End: 2023-06-27

## 2023-06-27 ENCOUNTER — APPOINTMENT (OUTPATIENT)
Dept: FAMILY MEDICINE | Facility: CLINIC | Age: 58
End: 2023-06-27
Payer: COMMERCIAL

## 2023-06-27 VITALS
HEIGHT: 65 IN | TEMPERATURE: 98.8 F | WEIGHT: 110 LBS | HEART RATE: 71 BPM | SYSTOLIC BLOOD PRESSURE: 113 MMHG | DIASTOLIC BLOOD PRESSURE: 71 MMHG | BODY MASS INDEX: 18.33 KG/M2 | OXYGEN SATURATION: 99 %

## 2023-06-27 DIAGNOSIS — D64.9 ANEMIA, UNSPECIFIED: ICD-10-CM

## 2023-06-27 DIAGNOSIS — K42.9 UMBILICAL HERNIA W/OUT OBSTRUCTION OR GANGRENE: ICD-10-CM

## 2023-06-27 DIAGNOSIS — F41.9 ANXIETY DISORDER, UNSPECIFIED: ICD-10-CM

## 2023-06-27 DIAGNOSIS — N39.0 URINARY TRACT INFECTION, SITE NOT SPECIFIED: ICD-10-CM

## 2023-06-27 DIAGNOSIS — A49.9 URINARY TRACT INFECTION, SITE NOT SPECIFIED: ICD-10-CM

## 2023-06-27 PROCEDURE — 99214 OFFICE O/P EST MOD 30 MIN: CPT | Mod: 25

## 2023-06-29 DIAGNOSIS — R79.89 OTHER SPECIFIED ABNORMAL FINDINGS OF BLOOD CHEMISTRY: ICD-10-CM

## 2023-06-29 LAB
25(OH)D3 SERPL-MCNC: 27.6 NG/ML
ALBUMIN SERPL ELPH-MCNC: 5.1 G/DL
ALP BLD-CCNC: 74 U/L
ALT SERPL-CCNC: 148 U/L
ANION GAP SERPL CALC-SCNC: 11 MMOL/L
APPEARANCE: CLEAR
AST SERPL-CCNC: 90 U/L
BILIRUB SERPL-MCNC: 0.4 MG/DL
BILIRUBIN URINE: NEGATIVE
BLOOD URINE: NEGATIVE
BUN SERPL-MCNC: 12 MG/DL
CALCIUM SERPL-MCNC: 10 MG/DL
CHLORIDE SERPL-SCNC: 103 MMOL/L
CHOLEST SERPL-MCNC: 257 MG/DL
CLAM IGE QN: <0.1 KUA/L
CO2 SERPL-SCNC: 28 MMOL/L
CODFISH IGE QN: <0.1 KUA/L
COLOR: YELLOW
CORN IGE QN: <0.1 KUA/L
COW MILK IGE QN: <0.1 KUA/L
CREAT SERPL-MCNC: 0.76 MG/DL
DEPRECATED CLAM IGE RAST QL: 0
DEPRECATED CODFISH IGE RAST QL: 0
DEPRECATED CORN IGE RAST QL: 0
DEPRECATED COW MILK IGE RAST QL: 0
DEPRECATED EGG WHITE IGE RAST QL: 0
DEPRECATED GLUTEN IGE RAST QL: <0.1 KUA/L
DEPRECATED PEANUT IGE RAST QL: 0
DEPRECATED SCALLOP IGE RAST QL: <0.1 KUA/L
DEPRECATED SESAME SEED IGE RAST QL: 0
DEPRECATED SHRIMP IGE RAST QL: 0
DEPRECATED SOYBEAN IGE RAST QL: 0
DEPRECATED WALNUT IGE RAST QL: 0
DEPRECATED WHEAT IGE RAST QL: 0
EGFR: 92 ML/MIN/1.73M2
EGG WHITE IGE QN: <0.1 KUA/L
ESTIMATED AVERAGE GLUCOSE: 120 MG/DL
FERRITIN SERPL-MCNC: 182 NG/ML
FOLATE SERPL-MCNC: 10.4 NG/ML
GLUCOSE QUALITATIVE U: NEGATIVE MG/DL
GLUCOSE SERPL-MCNC: 71 MG/DL
GLUTEN IGG QN: 0
HBA1C MFR BLD HPLC: 5.8 %
HDLC SERPL-MCNC: 68 MG/DL
KETONES URINE: NEGATIVE MG/DL
LDLC SERPL CALC-MCNC: 167 MG/DL
LEUKOCYTE ESTERASE URINE: ABNORMAL
NITRITE URINE: NEGATIVE
NONHDLC SERPL-MCNC: 189 MG/DL
PEANUT IGE QN: <0.1 KUA/L
PH URINE: 7
POTASSIUM SERPL-SCNC: 4.2 MMOL/L
PROT SERPL-MCNC: 7.2 G/DL
PROTEIN URINE: NEGATIVE MG/DL
SCALLOP IGE QN: 0
SCALLOP IGE QN: <0.1 KUA/L
SESAME SEED IGE QN: <0.1 KUA/L
SODIUM SERPL-SCNC: 143 MMOL/L
SOYBEAN IGE QN: <0.1 KUA/L
SPECIFIC GRAVITY URINE: 1.01
TOTAL IGE SMQN RAST: 6 KU/L
TRIGL SERPL-MCNC: 107 MG/DL
TSH SERPL-ACNC: 1.62 UIU/ML
UROBILINOGEN URINE: 0.2 MG/DL
VIT B12 SERPL-MCNC: 683 PG/ML
WALNUT IGE QN: <0.1 KUA/L
WHEAT IGE QN: <0.1 KUA/L

## 2023-06-30 LAB
HAV IGM SER QL: NONREACTIVE
HBV CORE IGM SER QL: NONREACTIVE
HBV SURFACE AG SER QL: NONREACTIVE
HCV AB SER QL: NONREACTIVE
HCV S/CO RATIO: 0.14 S/CO

## 2023-07-05 ENCOUNTER — NON-APPOINTMENT (OUTPATIENT)
Age: 58
End: 2023-07-05

## 2023-07-19 ENCOUNTER — APPOINTMENT (OUTPATIENT)
Dept: UROLOGY | Facility: CLINIC | Age: 58
End: 2023-07-19
Payer: COMMERCIAL

## 2023-07-19 VITALS
HEIGHT: 65 IN | DIASTOLIC BLOOD PRESSURE: 75 MMHG | SYSTOLIC BLOOD PRESSURE: 145 MMHG | OXYGEN SATURATION: 98 % | BODY MASS INDEX: 17.99 KG/M2 | WEIGHT: 108 LBS | HEART RATE: 79 BPM

## 2023-07-19 DIAGNOSIS — N39.41 URGE INCONTINENCE: ICD-10-CM

## 2023-07-19 PROCEDURE — 99213 OFFICE O/P EST LOW 20 MIN: CPT

## 2023-07-19 NOTE — LETTER BODY
[Dear  ___] : Dear  [unfilled], [Consult Letter:] : I had the pleasure of evaluating your patient, [unfilled]. [Please see my note below.] : Please see my note below. [Consult Closing:] : Thank you very much for allowing me to participate in the care of this patient.  If you have any questions, please do not hesitate to contact me. [Sincerely,] : Sincerely, [FreeTextEntry3] : Nikos Singh, DO\par Genitourinary Medicine\par

## 2023-07-19 NOTE — HISTORY OF PRESENT ILLNESS
[FreeTextEntry1] : Ms. BREONNA RAY 57 year old  F no  PMH and PSH right foot, right shoulder, right thumb, right carpal tunnel, c- section. Pt comes in bc of urgency with leakage. Pt also having urgencies with both BM and urination. Former smoker stopped 15 years smoked for about 20 years. \par

## 2023-07-19 NOTE — ASSESSMENT
[FreeTextEntry1] : Plan\par UA\par culture\par renal bladder US\par recommend Neurology \par fu 3 weeks

## 2023-07-19 NOTE — PHYSICAL EXAM
[General Appearance - Well Developed] : well developed [General Appearance - Well Nourished] : well nourished [Normal Appearance] : normal appearance [Well Groomed] : well groomed [General Appearance - In No Acute Distress] : no acute distress [Edema] : no peripheral edema [Respiration, Rhythm And Depth] : normal respiratory rhythm and effort [Exaggerated Use Of Accessory Muscles For Inspiration] : no accessory muscle use [Abdomen Soft] : soft [Abdomen Tenderness] : non-tender [Costovertebral Angle Tenderness] : no ~M costovertebral angle tenderness [Normal Station and Gait] : the gait and station were normal for the patient's age [] : no rash [No Focal Deficits] : no focal deficits [Oriented To Time, Place, And Person] : oriented to person, place, and time [Affect] : the affect was normal [Not Anxious] : not anxious [Mood] : the mood was normal

## 2023-07-21 LAB
APPEARANCE: CLEAR
BACTERIA UR CULT: NORMAL
BACTERIA: NEGATIVE /HPF
BILIRUBIN URINE: NEGATIVE
BLOOD URINE: NEGATIVE
CAST: 0 /LPF
COLOR: YELLOW
EPITHELIAL CELLS: 1 /HPF
GLUCOSE QUALITATIVE U: NEGATIVE MG/DL
KETONES URINE: NEGATIVE MG/DL
LEUKOCYTE ESTERASE URINE: ABNORMAL
MICROSCOPIC-UA: NORMAL
NITRITE URINE: NEGATIVE
PH URINE: 7
PROTEIN URINE: NEGATIVE MG/DL
RED BLOOD CELLS URINE: 1 /HPF
REVIEW: NORMAL
SPECIFIC GRAVITY URINE: 1.01
UROBILINOGEN URINE: 0.2 MG/DL
WHITE BLOOD CELLS URINE: 0 /HPF

## 2023-07-31 ENCOUNTER — RX RENEWAL (OUTPATIENT)
Age: 58
End: 2023-07-31

## 2023-08-01 ENCOUNTER — APPOINTMENT (OUTPATIENT)
Dept: FAMILY MEDICINE | Facility: CLINIC | Age: 58
End: 2023-08-01
Payer: COMMERCIAL

## 2023-08-01 DIAGNOSIS — G47.00 INSOMNIA, UNSPECIFIED: ICD-10-CM

## 2023-08-01 DIAGNOSIS — M54.50 LOW BACK PAIN, UNSPECIFIED: ICD-10-CM

## 2023-08-01 DIAGNOSIS — R35.0 FREQUENCY OF MICTURITION: ICD-10-CM

## 2023-08-01 DIAGNOSIS — Z01.818 ENCOUNTER FOR OTHER PREPROCEDURAL EXAMINATION: ICD-10-CM

## 2023-08-01 DIAGNOSIS — R19.4 CHANGE IN BOWEL HABIT: ICD-10-CM

## 2023-08-01 PROCEDURE — 99214 OFFICE O/P EST MOD 30 MIN: CPT | Mod: 25

## 2023-08-01 PROCEDURE — 93000 ELECTROCARDIOGRAM COMPLETE: CPT | Mod: 59

## 2023-09-08 ENCOUNTER — RX RENEWAL (OUTPATIENT)
Age: 58
End: 2023-09-08

## 2023-09-13 ENCOUNTER — RX RENEWAL (OUTPATIENT)
Age: 58
End: 2023-09-13

## 2023-10-13 ENCOUNTER — RX RENEWAL (OUTPATIENT)
Age: 58
End: 2023-10-13

## 2023-11-11 ENCOUNTER — RX RENEWAL (OUTPATIENT)
Age: 58
End: 2023-11-11

## 2023-11-21 ENCOUNTER — RX RENEWAL (OUTPATIENT)
Age: 58
End: 2023-11-21

## 2023-11-21 RX ORDER — HYDROXYZINE HYDROCHLORIDE 25 MG/1
25 TABLET ORAL EVERY 6 HOURS
Qty: 120 | Refills: 2 | Status: ACTIVE | COMMUNITY
Start: 2022-07-29 | End: 1900-01-01

## 2024-04-05 ENCOUNTER — APPOINTMENT (OUTPATIENT)
Dept: FAMILY MEDICINE | Facility: CLINIC | Age: 59
End: 2024-04-05

## 2024-04-05 VITALS
SYSTOLIC BLOOD PRESSURE: 110 MMHG | DIASTOLIC BLOOD PRESSURE: 69 MMHG | TEMPERATURE: 98.1 F | OXYGEN SATURATION: 96 % | HEART RATE: 85 BPM

## 2024-04-05 RX ORDER — PANTOPRAZOLE 40 MG/1
40 TABLET, DELAYED RELEASE ORAL
Qty: 180 | Refills: 1 | Status: ACTIVE | COMMUNITY
Start: 2023-03-16 | End: 1900-01-01

## 2024-04-20 ENCOUNTER — RX RENEWAL (OUTPATIENT)
Age: 59
End: 2024-04-20

## 2024-05-01 RX ORDER — NITROFURANTOIN (MONOHYDRATE/MACROCRYSTALS) 25; 75 MG/1; MG/1
100 CAPSULE ORAL
Qty: 10 | Refills: 0 | Status: ACTIVE | COMMUNITY
Start: 2024-05-01 | End: 1900-01-01

## 2024-05-16 ENCOUNTER — RX RENEWAL (OUTPATIENT)
Age: 59
End: 2024-05-16

## 2024-05-21 ENCOUNTER — APPOINTMENT (OUTPATIENT)
Dept: FAMILY MEDICINE | Facility: CLINIC | Age: 59
End: 2024-05-21
Payer: COMMERCIAL

## 2024-05-21 ENCOUNTER — LABORATORY RESULT (OUTPATIENT)
Age: 59
End: 2024-05-21

## 2024-05-21 VITALS
DIASTOLIC BLOOD PRESSURE: 83 MMHG | HEART RATE: 83 BPM | OXYGEN SATURATION: 98 % | SYSTOLIC BLOOD PRESSURE: 132 MMHG | HEIGHT: 65 IN | WEIGHT: 108 LBS | BODY MASS INDEX: 17.99 KG/M2

## 2024-05-21 DIAGNOSIS — R55 SYNCOPE AND COLLAPSE: ICD-10-CM

## 2024-05-21 DIAGNOSIS — F41.9 ANXIETY DISORDER, UNSPECIFIED: ICD-10-CM

## 2024-05-21 DIAGNOSIS — F32.A ANXIETY DISORDER, UNSPECIFIED: ICD-10-CM

## 2024-05-21 DIAGNOSIS — M54.9 DORSALGIA, UNSPECIFIED: ICD-10-CM

## 2024-05-21 DIAGNOSIS — R92.30 DENSE BREASTS, UNSPECIFIED: ICD-10-CM

## 2024-05-21 DIAGNOSIS — G89.29 DORSALGIA, UNSPECIFIED: ICD-10-CM

## 2024-05-21 DIAGNOSIS — M25.551 PAIN IN RIGHT HIP: ICD-10-CM

## 2024-05-21 PROCEDURE — G2211 COMPLEX E/M VISIT ADD ON: CPT

## 2024-05-21 PROCEDURE — 99214 OFFICE O/P EST MOD 30 MIN: CPT

## 2024-05-22 LAB
ALBUMIN SERPL ELPH-MCNC: 4.8 G/DL
ALP BLD-CCNC: 88 U/L
ALT SERPL-CCNC: 15 U/L
ANION GAP SERPL CALC-SCNC: 10 MMOL/L
APPEARANCE: CLEAR
AST SERPL-CCNC: 23 U/L
BACTERIA: NEGATIVE /HPF
BILIRUB SERPL-MCNC: 0.4 MG/DL
BILIRUBIN URINE: NEGATIVE
BLOOD URINE: NEGATIVE
BUN SERPL-MCNC: 14 MG/DL
CALCIUM SERPL-MCNC: 10.2 MG/DL
CAST: 0 /LPF
CHLORIDE SERPL-SCNC: 104 MMOL/L
CHOLEST SERPL-MCNC: 258 MG/DL
CO2 SERPL-SCNC: 28 MMOL/L
COLOR: YELLOW
CREAT SERPL-MCNC: 0.68 MG/DL
EGFR: 102 ML/MIN/1.73M2
EPITHELIAL CELLS: 1 /HPF
ESTIMATED AVERAGE GLUCOSE: 117 MG/DL
FERRITIN SERPL-MCNC: 101 NG/ML
FOLATE SERPL-MCNC: 10.6 NG/ML
GLUCOSE QUALITATIVE U: NEGATIVE MG/DL
GLUCOSE SERPL-MCNC: 90 MG/DL
HBA1C MFR BLD HPLC: 5.7 %
HCT VFR BLD CALC: 39.3 %
HDLC SERPL-MCNC: 77 MG/DL
HGB BLD-MCNC: 12.3 G/DL
KETONES URINE: NEGATIVE MG/DL
LDLC SERPL CALC-MCNC: 159 MG/DL
LEUKOCYTE ESTERASE URINE: ABNORMAL
MCHC RBC-ENTMCNC: 29.8 PG
MCHC RBC-ENTMCNC: 31.3 GM/DL
MCV RBC AUTO: 95.2 FL
MICROSCOPIC-UA: NORMAL
NITRITE URINE: NEGATIVE
NONHDLC SERPL-MCNC: 181 MG/DL
PH URINE: 7.5
PLATELET # BLD AUTO: 216 K/UL
POTASSIUM SERPL-SCNC: 4.4 MMOL/L
PROT SERPL-MCNC: 7.1 G/DL
PROTEIN URINE: NEGATIVE MG/DL
RBC # BLD: 4.13 M/UL
RBC # FLD: 12.7 %
RED BLOOD CELLS URINE: 0 /HPF
REVIEW: NORMAL
SODIUM SERPL-SCNC: 143 MMOL/L
SPECIFIC GRAVITY URINE: 1.01
TRIGL SERPL-MCNC: 124 MG/DL
TSH SERPL-ACNC: 1.02 UIU/ML
UROBILINOGEN URINE: 0.2 MG/DL
VIT B12 SERPL-MCNC: 556 PG/ML
WBC # FLD AUTO: 6.91 K/UL
WHITE BLOOD CELLS URINE: 0 /HPF

## 2024-05-23 LAB — BACTERIA UR CULT: NORMAL

## 2024-05-24 LAB
ALMOND IGE QN: <0.1 KUA/L
BRAZIL NUT IGE QN: <0.1 KUA/L
CASHEW NUT IGE QN: <0.1 KUA/L
CODFISH IGE QN: <0.1 KUA/L
COW MILK IGE QN: <0.1 KUA/L
DEPRECATED ALMOND IGE RAST QL: 0 (ref 0–?)
DEPRECATED BRAZIL NUT IGE RAST QL: 0 (ref 0–?)
DEPRECATED CASHEW NUT IGE RAST QL: 0 (ref 0–?)
DEPRECATED CODFISH IGE RAST QL: 0 (ref 0–?)
DEPRECATED COW MILK IGE RAST QL: 0 (ref 0–?)
DEPRECATED EGG WHITE IGE RAST QL: 0 (ref 0–?)
DEPRECATED HAZELNUT IGE RAST QL: 0 (ref 0–?)
DEPRECATED PEANUT IGE RAST QL: 0 (ref 0–?)
DEPRECATED SALMON IGE RAST QL: 0 (ref 0–?)
DEPRECATED SCALLOP IGE RAST QL: <0.1 KUA/L
DEPRECATED SESAME SEED IGE RAST QL: 0 (ref 0–?)
DEPRECATED SHRIMP IGE RAST QL: 0 (ref 0–?)
DEPRECATED SOYBEAN IGE RAST QL: 0 (ref 0–?)
DEPRECATED TUNA IGE RAST QL: 0 (ref 0–?)
DEPRECATED WALNUT IGE RAST QL: 0 (ref 0–?)
DEPRECATED WHEAT IGE RAST QL: 0 (ref 0–?)
EGG WHITE IGE QN: <0.1 KUA/L
HAZELNUT IGE QN: <0.1 KUA/L
PEANUT IGE QN: <0.1 KUA/L
SALMON IGE QN: <0.1 KUA/L
SCALLOP IGE QN: 0 (ref 0–?)
SCALLOP IGE QN: <0.1 KUA/L
SESAME SEED IGE QN: <0.1 KUA/L
SOYBEAN IGE QN: <0.1 KUA/L
TOTAL IGE SMQN RAST: 8 KU/L
TUNA IGE QN: <0.1 KUA/L
WALNUT IGE QN: <0.1 KUA/L
WHEAT IGE QN: <0.1 KUA/L

## 2024-05-30 ENCOUNTER — APPOINTMENT (OUTPATIENT)
Dept: FAMILY MEDICINE | Facility: CLINIC | Age: 59
End: 2024-05-30

## 2024-05-31 ENCOUNTER — RX RENEWAL (OUTPATIENT)
Age: 59
End: 2024-05-31

## 2024-06-05 ENCOUNTER — RX RENEWAL (OUTPATIENT)
Age: 59
End: 2024-06-05

## 2024-06-05 RX ORDER — CYCLOBENZAPRINE HYDROCHLORIDE 5 MG/1
5 TABLET, FILM COATED ORAL
Qty: 90 | Refills: 1 | Status: ACTIVE | COMMUNITY
Start: 2022-12-27 | End: 1900-01-01

## 2024-06-17 ENCOUNTER — RX RENEWAL (OUTPATIENT)
Age: 59
End: 2024-06-17

## 2024-06-17 RX ORDER — MELOXICAM 7.5 MG/1
7.5 TABLET ORAL TWICE DAILY
Qty: 30 | Refills: 0 | Status: ACTIVE | COMMUNITY
Start: 2024-04-05 | End: 1900-01-01

## 2024-06-25 ENCOUNTER — APPOINTMENT (OUTPATIENT)
Dept: FAMILY MEDICINE | Facility: CLINIC | Age: 59
End: 2024-06-25

## 2024-10-09 ENCOUNTER — RX RENEWAL (OUTPATIENT)
Age: 59
End: 2024-10-09

## 2024-10-31 ENCOUNTER — APPOINTMENT (OUTPATIENT)
Dept: CARDIOLOGY | Facility: CLINIC | Age: 59
End: 2024-10-31
Payer: COMMERCIAL

## 2024-10-31 VITALS
DIASTOLIC BLOOD PRESSURE: 62 MMHG | HEIGHT: 65 IN | WEIGHT: 108 LBS | OXYGEN SATURATION: 97 % | BODY MASS INDEX: 17.99 KG/M2 | HEART RATE: 64 BPM | SYSTOLIC BLOOD PRESSURE: 100 MMHG

## 2024-10-31 VITALS — DIASTOLIC BLOOD PRESSURE: 82 MMHG | SYSTOLIC BLOOD PRESSURE: 96 MMHG

## 2024-10-31 DIAGNOSIS — R55 SYNCOPE AND COLLAPSE: ICD-10-CM

## 2024-10-31 PROCEDURE — 93000 ELECTROCARDIOGRAM COMPLETE: CPT | Mod: 59

## 2024-10-31 PROCEDURE — 93246 EXT ECG>7D<15D RECORDING: CPT

## 2024-10-31 PROCEDURE — 99204 OFFICE O/P NEW MOD 45 MIN: CPT | Mod: 25

## 2024-10-31 RX ORDER — FLUVOXAMINE MALEATE 50 MG/1
50 TABLET ORAL
Refills: 0 | Status: ACTIVE | COMMUNITY

## 2024-10-31 RX ORDER — CLONIDINE HYDROCHLORIDE 0.1 MG/1
0.1 TABLET ORAL
Refills: 0 | Status: ACTIVE | COMMUNITY

## 2024-10-31 RX ORDER — LAMOTRIGINE 25 MG/1
25 TABLET ORAL TWICE DAILY
Refills: 0 | Status: ACTIVE | COMMUNITY

## 2024-11-21 PROCEDURE — 93248 EXT ECG>7D<15D REV&INTERPJ: CPT
